# Patient Record
Sex: FEMALE | Race: BLACK OR AFRICAN AMERICAN | Employment: FULL TIME | URBAN - METROPOLITAN AREA
[De-identification: names, ages, dates, MRNs, and addresses within clinical notes are randomized per-mention and may not be internally consistent; named-entity substitution may affect disease eponyms.]

---

## 2023-03-21 ENCOUNTER — OFFICE VISIT (OUTPATIENT)
Dept: URGENT CARE | Age: 43
End: 2023-03-21
Payer: COMMERCIAL

## 2023-03-21 VITALS
RESPIRATION RATE: 16 BRPM | DIASTOLIC BLOOD PRESSURE: 91 MMHG | WEIGHT: 189 LBS | SYSTOLIC BLOOD PRESSURE: 151 MMHG | TEMPERATURE: 98.3 F | OXYGEN SATURATION: 96 % | HEART RATE: 72 BPM

## 2023-03-21 DIAGNOSIS — R20.2 NUMBNESS AND TINGLING OF BOTH FEET: ICD-10-CM

## 2023-03-21 DIAGNOSIS — R20.0 NUMBNESS AND TINGLING IN BOTH HANDS: Primary | ICD-10-CM

## 2023-03-21 DIAGNOSIS — R20.2 NUMBNESS AND TINGLING IN BOTH HANDS: Primary | ICD-10-CM

## 2023-03-21 DIAGNOSIS — F10.29 ALCOHOL DEPENDENCE WITH UNSPECIFIED ALCOHOL-INDUCED DISORDER (HCC): ICD-10-CM

## 2023-03-21 DIAGNOSIS — M67.432 GANGLION OF WRIST, LEFT: ICD-10-CM

## 2023-03-21 DIAGNOSIS — R20.0 NUMBNESS AND TINGLING OF BOTH FEET: ICD-10-CM

## 2023-03-21 DIAGNOSIS — R03.0 ELEVATED BLOOD PRESSURE READING WITHOUT DIAGNOSIS OF HYPERTENSION: ICD-10-CM

## 2023-03-21 PROCEDURE — S9083 URGENT CARE CENTER GLOBAL: HCPCS | Performed by: FAMILY MEDICINE

## 2023-03-21 RX ORDER — PANTOPRAZOLE SODIUM 20 MG/1
20 TABLET, DELAYED RELEASE ORAL DAILY
COMMUNITY

## 2023-03-21 RX ORDER — HYDROCHLOROTHIAZIDE 12.5 MG/1
12.5 TABLET ORAL DAILY
COMMUNITY

## 2023-03-21 NOTE — PROGRESS NOTES
Numbness  This is a new problem. The current episode started more than 1 week ago. The problem occurs every several days. The problem has not changed since onset. Pertinent negatives include no chest pain, no abdominal pain, no headaches and no shortness of breath. Associated symptoms comments: She feels her hands and feet feels numb and tingling off and on- also feels cold   No injury - she went to ED few weeks before had  labs done- al reported normal    She is concerned that her elevated BP may be reason   She also admit frequent binge alcohol drinking and he sxs seems to be associated with that - never been addressed     . Exacerbated by: don't know. Relieved by: don't know. She has tried nothing for the symptoms. Past Medical History:   Diagnosis Date    GERD (gastroesophageal reflux disease)     Hypertension         Past Surgical History:   Procedure Laterality Date    IR GASTRIC BAND ADJ W/ FLUORO           Family History   Problem Relation Age of Onset    Diabetes Mother     Hypertension Mother     No Known Problems Father         Social History     Socioeconomic History    Marital status: SINGLE     Spouse name: Not on file    Number of children: Not on file    Years of education: Not on file    Highest education level: Not on file   Occupational History    Not on file   Tobacco Use    Smoking status: Never    Smokeless tobacco: Never   Substance and Sexual Activity    Alcohol use: Not on file    Drug use: Not on file    Sexual activity: Not on file   Other Topics Concern    Not on file   Social History Narrative    Not on file     Social Determinants of Health     Financial Resource Strain: Not on file   Food Insecurity: Not on file   Transportation Needs: Not on file   Physical Activity: Not on file   Stress: Not on file   Social Connections: Not on file   Intimate Partner Violence: Not on file   Housing Stability: Not on file                ALLERGIES: Patient has no known allergies.     Review of Systems   Respiratory:  Negative for shortness of breath. Cardiovascular:  Negative for chest pain. Gastrointestinal:  Negative for abdominal pain. Musculoskeletal:  Negative for arthralgias and joint swelling. Small cyst on left wrist- started over past 1 week- no injury    Neurological:  Positive for numbness (and Numbness). Negative for dizziness, tremors, speech difficulty and headaches. All other systems reviewed and are negative. Vitals:    03/21/23 1540 03/21/23 1543   BP: (!) 152/83 (!) 151/91   Pulse: 72    Resp: 16    Temp: 98.3 °F (36.8 °C)    SpO2: 96%    Weight: 189 lb (85.7 kg)        Physical Exam  Vitals and nursing note reviewed. Constitutional:       General: She is not in acute distress. Appearance: Normal appearance. She is not ill-appearing. Cardiovascular:      Rate and Rhythm: Normal rate and regular rhythm. Pulmonary:      Effort: Pulmonary effort is normal. No respiratory distress. Breath sounds: Normal breath sounds. No wheezing, rhonchi or rales. Musculoskeletal:      Left wrist: Swelling (ganglion on extensor tendon of thumb) present. No effusion, tenderness or bony tenderness. Normal range of motion. Arms:       Cervical back: Normal range of motion and neck supple. Lymphadenopathy:      Cervical: No cervical adenopathy. Skin:     Findings: No rash. Neurological:      General: No focal deficit present. Mental Status: She is alert and oriented to person, place, and time. Cranial Nerves: No cranial nerve deficit. Motor: No weakness. Gait: Gait normal.      Deep Tendon Reflexes: Reflexes normal.   Psychiatric:         Mood and Affect: Mood normal.       MDM    Procedures        ICD-10-CM ICD-9-CM    1. Numbness and tingling in both hands  R20.0 782.0     R20.2        2. Numbness and tingling of both feet  R20.0 782.0     R20.2        3. Elevated blood pressure reading without diagnosis of hypertension  R03.0 796.2       4. Alcohol dependence with unspecified alcohol-induced disorder ([facility] [address]  F10.29 303.90      291.9       5. Ganglion of wrist, left  M67.432 727.41         Use riboflavin 100 mg daily     Also start B complex   Follow with Orthopedic    Monitor Blood pressure - decreased salt intake       No orders of the defined types were placed in this encounter. No results found for any visits on 03/21/23. The patients condition was discussed with the patient and they understand. The patient is to follow up with primary care doctor. If signs and symptoms become worse the pt is to go to the ER. The patient is to take medications as prescribed.

## 2023-03-21 NOTE — PATIENT INSTRUCTIONS
Use riboflavin 100 mg daily     Also start B complex   Follow with Orthopedic    Monitor Blood pressure - decreased salt intake

## 2023-10-29 ENCOUNTER — OFFICE VISIT (OUTPATIENT)
Age: 43
End: 2023-10-29

## 2023-10-29 VITALS
WEIGHT: 293 LBS | HEART RATE: 87 BPM | SYSTOLIC BLOOD PRESSURE: 132 MMHG | HEIGHT: 66 IN | BODY MASS INDEX: 47.09 KG/M2 | DIASTOLIC BLOOD PRESSURE: 95 MMHG | OXYGEN SATURATION: 99 % | RESPIRATION RATE: 18 BRPM | TEMPERATURE: 98.7 F

## 2023-10-29 DIAGNOSIS — Z20.2 POSSIBLE EXPOSURE TO STD: Primary | ICD-10-CM

## 2023-10-29 DIAGNOSIS — N76.0 VULVOVAGINITIS: ICD-10-CM

## 2023-10-29 RX ORDER — METRONIDAZOLE 500 MG/1
500 TABLET ORAL 2 TIMES DAILY
Qty: 14 TABLET | Refills: 0 | Status: SHIPPED | OUTPATIENT
Start: 2023-10-29 | End: 2023-11-05

## 2023-10-29 RX ORDER — FLUCONAZOLE 150 MG/1
150 TABLET ORAL ONCE
Qty: 1 TABLET | Refills: 0 | Status: SHIPPED | OUTPATIENT
Start: 2023-10-29 | End: 2023-10-29

## 2023-10-29 NOTE — PROGRESS NOTES
Norman Glover (:  1980) is a 41 y.o. female,Established patient, here for evaluation of the following chief complaint(s):  Vaginal Discharge (Vaginal itch, discharge, and odor for 3 week. Took a monistat and that did not resolve symptoms.)      ASSESSMENT/PLAN:  Visit Diagnoses and Associated Orders       Possible exposure to STD    -  Primary    Nuswab Vaginitis Plus (VG+) [61157 Custom]   - Future Order    HIV Screen [64754 Custom]      T. pallidum Ab [96452 Custom]   - Future Order    HCV RNA PCR Qn Rfx NS5A [17710 CPT(R)]   - Future Order    Hepatitis B Surface Antigen [76531 Custom]           Vulvovaginitis        Nuswab Vaginitis Plus (VG+) [65988 Custom]   - Future Order    fluconazole (DIFLUCAN) 150 MG tablet [52935]      metroNIDAZOLE (FLAGYL) 500 MG tablet [5016]           ORDERS WITHOUT AN ASSOCIATED DIAGNOSIS    SEMAGLUTIDE,0.25 OR 0.5MG/DOS, SC [270446]               To treat for BV and yeast with metronidazole 500 mg BID x 7 days and diflucan 150 mg x 1 dose pending nuswab results. STD panel sent, will follow-up with patient on receipt of results. Follow up in PRN days if symptoms persist or if symptoms worsen. SUBJECTIVE/OBJECTIVE:  HPI     41 y.o. female presents with symptoms of  vaginal itch, discharge and odor x 3 weeks. Symptoms are similar to when she has had BV and yeast in the past. She is typically treated with both diflucan and metronidazole. Used Monistat 2 weeks ago with mild improvement then return of her sx. She reports on 23 she had a condom break during sexual intercourse with a male partner. She is also requesting STD testing at today's visit. Denies known STDs in sexual partner. Denies symptoms after this time. Denies dysuria, urinary frequency, abdominal pain, dyspareunia, hematuria, fever/chills/sweats, back pain. LBM 10/07/2023.           Vitals:    10/29/23 0934   BP: (!) 132/95   Site: Left Upper Arm   Position: Sitting   Cuff Size: Large Adult   Pulse: 87

## 2023-11-01 LAB
A VAGINAE DNA VAG QL NAA+PROBE: NORMAL SCORE
BVAB2 DNA VAG QL NAA+PROBE: NORMAL SCORE
C ALBICANS DNA VAG QL NAA+PROBE: NEGATIVE
C GLABRATA DNA VAG QL NAA+PROBE: NEGATIVE
C TRACH RRNA SPEC QL NAA+PROBE: NEGATIVE
MEGA1 DNA VAG QL NAA+PROBE: NORMAL SCORE
N GONORRHOEA RRNA SPEC QL NAA+PROBE: NEGATIVE
SPECIMEN SOURCE: 2009
T VAGINALIS RRNA SPEC QL NAA+PROBE: NEGATIVE